# Patient Record
Sex: MALE | Race: BLACK OR AFRICAN AMERICAN
[De-identification: names, ages, dates, MRNs, and addresses within clinical notes are randomized per-mention and may not be internally consistent; named-entity substitution may affect disease eponyms.]

---

## 2020-09-23 ENCOUNTER — HOSPITAL ENCOUNTER (EMERGENCY)
Dept: HOSPITAL 62 - ER | Age: 60
Discharge: HOME | End: 2020-09-23
Payer: MEDICARE

## 2020-09-23 VITALS — DIASTOLIC BLOOD PRESSURE: 79 MMHG | SYSTOLIC BLOOD PRESSURE: 139 MMHG

## 2020-09-23 DIAGNOSIS — E86.0: ICD-10-CM

## 2020-09-23 DIAGNOSIS — R55: ICD-10-CM

## 2020-09-23 DIAGNOSIS — E78.5: ICD-10-CM
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
## 2020-09-23 DIAGNOSIS — I10: ICD-10-CM

## 2020-09-23 DIAGNOSIS — S00.03XA: Primary | ICD-10-CM

## 2020-09-23 DIAGNOSIS — E11.9: ICD-10-CM

## 2020-09-23 LAB
ADD MANUAL DIFF: NO
ALBUMIN SERPL-MCNC: 3.9 G/DL (ref 3.5–5)
ALP SERPL-CCNC: 155 U/L (ref 38–126)
ANION GAP SERPL CALC-SCNC: 16 MMOL/L (ref 5–19)
AST SERPL-CCNC: 51 U/L (ref 17–59)
BASOPHILS # BLD AUTO: 0.1 10^3/UL (ref 0–0.2)
BASOPHILS NFR BLD AUTO: 0.5 % (ref 0–2)
BILIRUB DIRECT SERPL-MCNC: 0.4 MG/DL (ref 0–0.4)
BILIRUB SERPL-MCNC: 0.6 MG/DL (ref 0.2–1.3)
BUN SERPL-MCNC: 42 MG/DL (ref 7–20)
CALCIUM: 9.4 MG/DL (ref 8.4–10.2)
CHLORIDE SERPL-SCNC: 101 MMOL/L (ref 98–107)
CK SERPL-CCNC: 58 U/L (ref 55–170)
CO2 SERPL-SCNC: 22 MMOL/L (ref 22–30)
EOSINOPHIL # BLD AUTO: 0 10^3/UL (ref 0–0.6)
EOSINOPHIL NFR BLD AUTO: 0.2 % (ref 0–6)
ERYTHROCYTE [DISTWIDTH] IN BLOOD BY AUTOMATED COUNT: 13.5 % (ref 11.5–14)
GLUCOSE SERPL-MCNC: 358 MG/DL (ref 75–110)
HCT VFR BLD CALC: 36.8 % (ref 37.9–51)
HGB BLD-MCNC: 12.7 G/DL (ref 13.5–17)
LYMPHOCYTES # BLD AUTO: 1.3 10^3/UL (ref 0.5–4.7)
LYMPHOCYTES NFR BLD AUTO: 12.7 % (ref 13–45)
MCH RBC QN AUTO: 29.7 PG (ref 27–33.4)
MCHC RBC AUTO-ENTMCNC: 34.7 G/DL (ref 32–36)
MCV RBC AUTO: 86 FL (ref 80–97)
MONOCYTES # BLD AUTO: 1.2 10^3/UL (ref 0.1–1.4)
MONOCYTES NFR BLD AUTO: 11.7 % (ref 3–13)
NEUTROPHILS # BLD AUTO: 7.6 10^3/UL (ref 1.7–8.2)
NEUTS SEG NFR BLD AUTO: 74.9 % (ref 42–78)
PLATELET # BLD: 538 10^3/UL (ref 150–450)
POTASSIUM SERPL-SCNC: 4.5 MMOL/L (ref 3.6–5)
PROT SERPL-MCNC: 7.1 G/DL (ref 6.3–8.2)
RBC # BLD AUTO: 4.29 10^6/UL (ref 4.35–5.55)
TOTAL CELLS COUNTED % (AUTO): 100 %
WBC # BLD AUTO: 10.2 10^3/UL (ref 4–10.5)

## 2020-09-23 PROCEDURE — 93010 ELECTROCARDIOGRAM REPORT: CPT

## 2020-09-23 PROCEDURE — 85025 COMPLETE CBC W/AUTO DIFF WBC: CPT

## 2020-09-23 PROCEDURE — 93005 ELECTROCARDIOGRAM TRACING: CPT

## 2020-09-23 PROCEDURE — 96361 HYDRATE IV INFUSION ADD-ON: CPT

## 2020-09-23 PROCEDURE — 80053 COMPREHEN METABOLIC PANEL: CPT

## 2020-09-23 PROCEDURE — 84484 ASSAY OF TROPONIN QUANT: CPT

## 2020-09-23 PROCEDURE — 83036 HEMOGLOBIN GLYCOSYLATED A1C: CPT

## 2020-09-23 PROCEDURE — 70450 CT HEAD/BRAIN W/O DYE: CPT

## 2020-09-23 PROCEDURE — 96360 HYDRATION IV INFUSION INIT: CPT

## 2020-09-23 PROCEDURE — 99285 EMERGENCY DEPT VISIT HI MDM: CPT

## 2020-09-23 PROCEDURE — 36415 COLL VENOUS BLD VENIPUNCTURE: CPT

## 2020-09-23 PROCEDURE — 82550 ASSAY OF CK (CPK): CPT

## 2020-09-23 NOTE — RADIOLOGY REPORT (SQ)
EXAM DESCRIPTION:  CT HEAD WITHOUT



IMAGES COMPLETED DATE/TIME:  9/23/2020 5:05 pm



REASON FOR STUDY:  Fall, struck head on pavement, severe MR



COMPARISON:  None.



TECHNIQUE:  Axial images acquired through the brain without intravenous contrast.  Images reviewed wi
th bone, brain and subdural windows.  Additional sagittal and coronal reconstructions were generated.
 Images stored on PACS.

All CT scanners at this facility use dose modulation, iterative reconstruction, and/or weight based d
osing when appropriate to reduce radiation dose to as low as reasonably achievable (ALARA).

[facility]: Dose Right  [facility]: CareDose    [facility]: Dose Right    [facility]: Teradose 4D    [facility]: Smart Cervalis



RADIATION DOSE:  CT Rad equipment meets quality standard of care and radiation dose reduction techniq
ues were employed. CTDIvol: 53.2 mGy. DLP: 1017 mGy-cm. mGy.



LIMITATIONS:  None.



FINDINGS:  VENTRICLES: Prominent.

CEREBRUM: No masses.  No hemorrhage.  No midline shift.  Areas of low density in the white matter mos
t likely due to chronic micro-vascular ischemic change.  No evidence for acute infarction.

CEREBELLUM: No masses.  No hemorrhage.  No alteration of density.  No evidence for acute infarction.

EXTRAAXIAL SPACES: Mild age-related involutional change.  No fluid collections.  No masses.

ORBITS AND GLOBE: No intra- or extraconal masses.  Normal contour of globe without masses.

CALVARIUM: No fracture.

PARANASAL SINUSES: No fluid or mucosal thickening.

SOFT TISSUES: No mass or hematoma.

OTHER: No other significant finding.



IMPRESSION:  MILD CHRONIC CHANGES OF ATROPHY AND MICROVASCULAR ISCHEMIA.  NO ACUTE PROCESS.

EVIDENCE OF ACUTE STROKE: NO.



TECHNICAL DOCUMENTATION:  JOB ID:  [id number]

Quality ID # [id number]: Final reports with documentation of one or more dose reduction techniques (e.g., Au
tomated exposure control, adjustment of the mA and/or kV according to patient size, use of iterative 
reconstruction technique)

 2011 [employer]- All Rights Reserved



Reading location - IP/workstation name: [email or web address]

## 2020-09-23 NOTE — ER DOCUMENT REPORT
Entered by DONAVON HUNT SCRIBE  09/23/20 1454 





Acting as scribe for:ANATOLIY CAMPBELL MD





ED Fall





- General


Chief Complaint: Fall


Stated Complaint: FALL


Time Seen by Provider: 09/23/20 14:52


Primary Care Provider: 


JENELLE SORENSON MD [Primary Care Provider] - Follow up as needed


Mode of Arrival: Medic


Information source: Parent


Notes: 





This 60 year old male patient with a history significant for mental retardation,

hypertension, hyperlipidemia, and type 2 diabetes brought in by EMS from home 

presents to the ED today for evaluation of a fall that occurred prior to 

arrival. Mother at bedside states that the patient was returning home after 

having a suprapubic catheter placed this afternoon when he got out of the car 

and fell x2 on the pavement. She states that he hit his head, but denies LOC. 

She mentions that it appeared as if he was unsteady on his feet and that he 

usually able to ambulated on his own without difficulty. She states that the 

patient has been NPO since yesterday evening. EMS reports BGL of 404.








Past Medical History





- General


Information source: Parent





- Social History


Smoking Status: Unknown if Ever Smoked


Smoking Education Provided: No


Lives with: Parents


Family History: Reviewed & Not Pertinent





- Medical History


Medical History: Other - Hx Mental Retardation





- Past Medical History


Cardiac Medical History: Reports: Hx Hypercholesterolemia, Hx Hypertension


Endocrine Medical History: Reports: Hx Diabetes Mellitus Type 2


Renal/ Medical History: Reports: Hx Benign Prostatic Hyperplasia





Physical Exam





- Vital signs


Vitals: 


                                        











Temp Pulse Resp BP Pulse Ox


 


 97.3 F   103 H  18   129/72 H  100 


 


 09/23/20 13:43  09/23/20 13:43  09/23/20 13:43  09/23/20 13:43  09/23/20 13:43














- General


General appearance: Alert


In distress: None





- HEENT


Head: Abrasions - Abrasion and swelling over frontotemporal area of the scalp


Eyes: Normal


Pupils: PERRL





- Respiratory


Respiratory status: No respiratory distress


Chest status: Nontender


Breath sounds: Normal


Chest palpation: Normal





- Cardiovascular


Rhythm: Regular


Heart sounds: Normal auscultation


Murmur: No


Friction rub: No


Gallop: None auscultated





- Abdominal


Inspection: Normal


Distension: No distension


Bowel sounds: Normal


Tenderness: Nontender


Organomegaly: No organomegaly





- Back


Back: Normal, Nontender





- Extremities


General upper extremity: Normal inspection


General lower extremity: Normal inspection.  No: Edema





- Neurological


Neuro grossly intact: Yes - Nonverbal at baseline per patient's mother





- Psychological


Associated symptoms: Normal affect, Normal mood





- Skin


Skin Temperature: Warm


Skin Moisture: Dry


Skin Color: Normal





Course





- Vital Signs


Vital signs: 


                                        











Temp Pulse Resp BP Pulse Ox


 


 97.3 F   103 H  18   129/72 H  100 


 


 09/23/20 13:43  09/23/20 13:43  09/23/20 13:43  09/23/20 13:43  09/23/20 13:43














- Laboratory


Result Diagrams: 


                                 09/23/20 15:50





                                 09/23/20 15:50


Laboratory results interpreted by me: 


                                        











  09/23/20 09/23/20 09/23/20





  15:50 15:50 15:50


 


RBC  4.29 L  


 


Hgb  12.7 L  


 


Hct  36.8 L  


 


Plt Count  538 H  


 


Lymph % (Auto)  12.7 L  


 


BUN   42 H 


 


Creatinine   1.58 H 


 


Est GFR ( Amer)   55 L 


 


Est GFR (MDRD) Non-Af   45 L 


 


Glucose   358 H 


 


Hemoglobin A1c %    9.7 H


 


ALT   173 H 


 


Alkaline Phosphatase   155 H 














- Diagnostic Test


Radiology reviewed: Image reviewed, Reports reviewed - CT scan of the head shows

chronic atrophy and microvascular ischemic changes with nothing acute.





- EKG Interpretation by Me


EKG shows normal: Sinus rhythm, Axis, Intervals, QRS Complexes, ST-T Waves


Rate: Tachycardia - 103





Discharge





- Discharge


Clinical Impression: 


 Syncope and collapse, Dehydration, Poorly controlled diabetes mellitus





Scalp contusion


Qualifiers:


 Encounter type: initial encounter Qualified Code(s): S00.03XA - Contusion of 

scalp, initial encounter





Condition: Stable


Disposition: HOME, SELF-CARE


Additional Instructions: 


Syncopal Episode:





     Syncope (fainting or near-fainting) can occur from many different health 

problems.  Or it can be a simple fainting spell requiring no treatment.  It is 

safe for you to go home, but further evaluation will likely be necessary.


     Your work-up may include tests for internal bleeding, heart disease, 

medication problems, or near-strokes.  Tests are not always required, however, 

depending on the nature of your problem.


     The warning signs of an impending faint include: dizziness, lighthea

dedness, nausea, hot flashes, tingling, and weakness.  If this happens, lay down

and put your feet up, then wait until all of these symptoms have passed before 

standing up again.


     If these episodes become recurrent, or if you develop chest pain, heart 

palpitations, mental confusion, blurred vision, or headache, then you should 

call the physician, or go to the emergency room.





Dehydration:





     Dehydration can result from vomiting or diarrhea, fever, or decreased 

intake of fluids.  If severe, hospitalization and intravenous fluids may be 

required.  Most cases are treated at home with fluids by mouth.


     For the next 24 hours, drink lots of clear fluids.  In mild cases, this can

be soda pop or sports drinks.  For more severe dehydration, the doctor may 

recommend special fluids such as Pedialyte or Lytren.  Try to get three liters 

(3 quarts) of fluid per day.  If vomiting occurs, continue to drink the fluids 

frequently (every 15 to 20 minutes), but in small amounts (one or two ounces).


     Depending on the type of dehydration, the doctor may prescribe antinausea 

medicine or potassium replacements.


     Call the doctor or return for re-examination if you become progressively we

ak, vomit repeatedly, or have other new symptoms.





Hyperglycemia:





     Your blood sugar was quite high today at 358.  Hemoglobin A1c was 9.7 

indicating these high blood sugars are a chronic problem.











 

********************************************************************************


************





Your blood work today suggest that you are dehydrated.  That would be expected 

since you had not had anything to eat or drink since before midnight.





You probably collapsed due to your blood pressure going low when you stood up 

since you were dehydrated.


Also your blood sugars are running quite high and that also contributes to 

becoming dehydrated.





The contusion to your scalp is not serious.





You should drink plenty of fluids throughout the day today and take all of your 

regular medications when you get home.





Follow-up with your primary care provider and take copies of your lab work with 

you when you go to see him.








RETURN TO THE EMERGENCY ROOM IF ANY NEW OR WORSENING SYMPTOMS.








Referrals: 


JENELLE SORENSON MD [Primary Care Provider] - Follow up as needed


TE AMARAL MD [NO LOCAL MD] - Follow up in 3-5 days





I personally performed the services described in the documentation, reviewed and

edited the documentation which was dictated to the scribe in my presence, and it

accurately records my words and actions.

## 2020-09-23 NOTE — EKG REPORT
SEVERITY:- OTHERWISE NORMAL ECG -

SINUS TACHYCARDIA

:

Confirmed by: Connie Fontana MD 23-Sep-2020 19:18:11